# Patient Record
Sex: MALE | Race: WHITE
[De-identification: names, ages, dates, MRNs, and addresses within clinical notes are randomized per-mention and may not be internally consistent; named-entity substitution may affect disease eponyms.]

---

## 2023-08-30 ENCOUNTER — HOSPITAL ENCOUNTER (OUTPATIENT)
Dept: HOSPITAL 76 - DI | Age: 28
Discharge: HOME | End: 2023-08-30
Attending: FAMILY MEDICINE
Payer: COMMERCIAL

## 2023-08-30 DIAGNOSIS — S63.502A: Primary | ICD-10-CM

## 2023-08-31 NOTE — XRAY REPORT
PROCEDURE:  Wrist 3 View LT

 

INDICATIONS: XR WRIST COMPLETE 3 VIEWS

 

TECHNIQUE:  3 views of the wrist were acquired.  

 

COMPARISON:  None.

 

FINDINGS:  

 

Bones:  No fractures or dislocations.  No suspicious bony lesions.  

 

Soft tissues:  No suspicious soft tissue calcifications or masses.  

 

 

IMPRESSION:  

No acute bony abnormality. If clinical symptoms persist, consider a follow-up exam in 7-10 days.

 

 

 

Reviewed by: America Botello MD on 8/31/2023 10:09 AM PDT

Approved by: America Botello MD on 8/31/2023 10:09 AM PDT

 

 

Station ID:  [id number]